# Patient Record
Sex: MALE | Race: WHITE | NOT HISPANIC OR LATINO | Employment: FULL TIME | URBAN - METROPOLITAN AREA
[De-identification: names, ages, dates, MRNs, and addresses within clinical notes are randomized per-mention and may not be internally consistent; named-entity substitution may affect disease eponyms.]

---

## 2017-05-22 ENCOUNTER — HOSPITAL ENCOUNTER (EMERGENCY)
Facility: HOSPITAL | Age: 32
Discharge: HOME | End: 2017-05-22
Attending: EMERGENCY MEDICINE
Payer: MEDICAID

## 2017-05-22 VITALS
BODY MASS INDEX: 28.89 KG/M2 | HEART RATE: 97 BPM | OXYGEN SATURATION: 99 % | RESPIRATION RATE: 18 BRPM | SYSTOLIC BLOOD PRESSURE: 135 MMHG | TEMPERATURE: 98 F | DIASTOLIC BLOOD PRESSURE: 74 MMHG | WEIGHT: 225 LBS

## 2017-05-22 DIAGNOSIS — M25.562 KNEE PAIN, LEFT: Primary | ICD-10-CM

## 2017-05-22 DIAGNOSIS — S83.92XA SPRAIN OF LEFT KNEE, UNSPECIFIED LIGAMENT, INITIAL ENCOUNTER: ICD-10-CM

## 2017-05-22 DIAGNOSIS — S80.12XA CONTUSION OF LEFT KNEE AND LOWER LEG, INITIAL ENCOUNTER: ICD-10-CM

## 2017-05-22 DIAGNOSIS — S80.02XA CONTUSION OF LEFT KNEE AND LOWER LEG, INITIAL ENCOUNTER: ICD-10-CM

## 2017-05-22 PROCEDURE — 25000003 PHARM REV CODE 250: Performed by: EMERGENCY MEDICINE

## 2017-05-22 PROCEDURE — 29505 APPLICATION LONG LEG SPLINT: CPT | Mod: LT

## 2017-05-22 PROCEDURE — 99283 EMERGENCY DEPT VISIT LOW MDM: CPT | Mod: 25

## 2017-05-22 RX ORDER — TRAMADOL HYDROCHLORIDE 50 MG/1
50 TABLET ORAL EVERY 6 HOURS PRN
Qty: 12 TABLET | Refills: 0 | Status: SHIPPED | OUTPATIENT
Start: 2017-05-22 | End: 2018-03-08

## 2017-05-22 RX ORDER — IBUPROFEN 200 MG
800 TABLET ORAL
Status: DISCONTINUED | OUTPATIENT
Start: 2017-05-22 | End: 2017-05-22

## 2017-05-22 RX ORDER — NAPROXEN 500 MG/1
500 TABLET ORAL 2 TIMES DAILY WITH MEALS
Qty: 20 TABLET | Refills: 0 | Status: SHIPPED | OUTPATIENT
Start: 2017-05-22 | End: 2018-03-08

## 2017-05-22 RX ORDER — HYDROCODONE BITARTRATE AND ACETAMINOPHEN 10; 325 MG/1; MG/1
1 TABLET ORAL
Status: COMPLETED | OUTPATIENT
Start: 2017-05-22 | End: 2017-05-22

## 2017-05-22 RX ORDER — HYDROCODONE BITARTRATE AND ACETAMINOPHEN 10; 325 MG/1; MG/1
1 TABLET ORAL
Status: DISCONTINUED | OUTPATIENT
Start: 2017-05-22 | End: 2017-05-22

## 2017-05-22 RX ORDER — NAPROXEN 500 MG/1
500 TABLET ORAL
Status: COMPLETED | OUTPATIENT
Start: 2017-05-22 | End: 2017-05-22

## 2017-05-22 RX ORDER — TRAMADOL HYDROCHLORIDE 50 MG/1
50 TABLET ORAL
Status: DISCONTINUED | OUTPATIENT
Start: 2017-05-22 | End: 2017-05-22

## 2017-05-22 RX ADMIN — HYDROCODONE BITARTRATE AND ACETAMINOPHEN 1 TABLET: 10; 325 TABLET ORAL at 12:05

## 2017-05-22 RX ADMIN — NAPROXEN 500 MG: 500 TABLET ORAL at 12:05

## 2017-05-22 NOTE — ED PROVIDER NOTES
Encounter Date: 5/22/2017       History     Chief Complaint   Patient presents with    Knee Pain     left knee pain x 1 week, denies inury     Review of patient's allergies indicates:   Allergen Reactions    Penicillins Anaphylaxis    Tramadol Palpitations       Leg Pain    The incident occurred at work. Injury mechanism: Patient has been kneeling on his knees at work and is aggravated injury to his left knee. The incident occurred yesterday. The pain is present in the left knee. The pain is at a severity of 4/10. The pain has been intermittent since onset. Pertinent negatives include no numbness, no inability to bear weight, no loss of motion, no muscle weakness, no loss of sensation and no tingling. He reports no foreign bodies present. The symptoms are aggravated by activity, bearing weight and palpation. He has tried nothing for the symptoms. The treatment provided no relief.    patient has a history of Osgood elliott's disease with some swelling of his left tibial tuberosity and has been underneath houses and kneeling and does not have his knee pads anymore so is aggravated his left knee and has emergency department complaining about pain.  He was unable to see his primary care physician.      Past Medical History:   Diagnosis Date    Osgood-Schlatter's disease of left lower extremity      Past Surgical History:   Procedure Laterality Date    NO PAST SURGERIES       Family History   Problem Relation Age of Onset    No Known Problems Mother     No Known Problems Father      Social History   Substance Use Topics    Smoking status: Current Every Day Smoker     Packs/day: 1.00     Types: Cigarettes    Smokeless tobacco: Never Used    Alcohol use 7.2 oz/week     12 Cans of beer per week      Comment: weekends     Review of Systems   Constitutional: Negative for fever.   HENT: Negative for sore throat.    Respiratory: Negative for shortness of breath.    Cardiovascular: Negative for chest pain.    Gastrointestinal: Negative for nausea.   Genitourinary: Negative for dysuria.   Musculoskeletal: Positive for arthralgias and joint swelling. Negative for back pain, myalgias, neck pain and neck stiffness.   Skin: Negative for color change and rash.   Neurological: Negative for tingling, weakness and numbness.   Hematological: Does not bruise/bleed easily.       Physical Exam     Initial Vitals [05/22/17 1030]   BP Pulse Resp Temp SpO2   138/82 68 16 97.8 °F (36.6 °C) 96 %     Physical Exam    Nursing note and vitals reviewed.  Constitutional: He appears well-developed and well-nourished.   HENT:   Head: Normocephalic and atraumatic.   Mouth/Throat: Oropharynx is clear and moist.   Eyes: EOM are normal. Pupils are equal, round, and reactive to light.   Neck: Normal range of motion. Neck supple.   Cardiovascular: Normal rate, regular rhythm, normal heart sounds and intact distal pulses.   Pulmonary/Chest: Breath sounds normal. No respiratory distress. He has no wheezes. He has no rhonchi.   Abdominal: Soft. Bowel sounds are normal. There is no rebound.   Musculoskeletal: Normal range of motion. He exhibits no edema.        Legs:  Neurological: He is alert and oriented to person, place, and time. He has normal strength. No cranial nerve deficit or sensory deficit.   Skin: Skin is warm and dry. No rash noted.   Psychiatric: He has a normal mood and affect. His behavior is normal. Judgment and thought content normal.         ED Course   Splint Application  Date/Time: 5/22/2017 4:50 PM  Performed by: VINH CERVANTES.  Authorized by: VINH CERVANTES   Location details: left knee  Splint type: Left knee immobilizer.  Post-procedure: The splinted body part was neurovascularly unchanged following the procedure.        Labs Reviewed - No data to display     Imaging Results          X-Ray Knee 3 View Left (Final result)  Result time 05/22/17 11:36:42    Final result by Hussain Lane MD (05/22/17 11:36:42)                  Impression:      Soft tissue swelling more notably along the inferior patellar tendon anterior to the tibial tuberosity with a tiny linear soft tissue calcification at this location..      Electronically signed by: CONNER PADRON MD  Date:     05/22/17  Time:    11:36              Narrative:    EXAM: XR KNEE 3 VIEW LEFT    CLINICAL HISTORY:  Left knee pain    FINDINGS:  Negative for fracture or dislocation.  No significant arthritic changes.  A mild soft tissue swelling around the knee soft tissue swelling anterior to the tibial tuberosity along the inferior patellar tendon with a tiny linear soft tissue calcification adjacent to the tibial tuberosity.                                                  ED Course     Clinical Impression:   The primary encounter diagnosis was Knee pain, left. Diagnoses of Contusion of left knee and lower leg, initial encounter and Sprain of left knee, unspecified ligament, initial encounter were also pertinent to this visit.          Brady Yen MD  05/22/17 5602

## 2018-02-16 ENCOUNTER — HOSPITAL ENCOUNTER (OUTPATIENT)
Dept: CARDIOLOGY | Facility: CLINIC | Age: 33
Discharge: HOME | End: 2018-02-16
Payer: MEDICAID

## 2018-02-16 DIAGNOSIS — R07.9 CHEST PAIN, UNSPECIFIED: ICD-10-CM

## 2018-02-16 DIAGNOSIS — R00.2 PALPITATIONS: Primary | ICD-10-CM

## 2018-02-16 DIAGNOSIS — R07.9 CHEST PAIN, UNSPECIFIED: Primary | ICD-10-CM

## 2018-02-16 PROCEDURE — 93005 ELECTROCARDIOGRAM TRACING: CPT | Mod: PBBFAC,PO | Performed by: NURSE PRACTITIONER

## 2018-02-16 PROCEDURE — 93010 ELECTROCARDIOGRAM REPORT: CPT | Mod: ,,, | Performed by: INTERNAL MEDICINE

## 2018-03-08 ENCOUNTER — HOSPITAL ENCOUNTER (EMERGENCY)
Facility: HOSPITAL | Age: 33
Discharge: HOME | End: 2018-03-08
Attending: EMERGENCY MEDICINE
Payer: MEDICAID

## 2018-03-08 VITALS
SYSTOLIC BLOOD PRESSURE: 126 MMHG | BODY MASS INDEX: 31.07 KG/M2 | RESPIRATION RATE: 14 BRPM | WEIGHT: 242 LBS | TEMPERATURE: 99 F | HEART RATE: 52 BPM | DIASTOLIC BLOOD PRESSURE: 69 MMHG | OXYGEN SATURATION: 97 %

## 2018-03-08 DIAGNOSIS — R03.0 ELEVATED BLOOD PRESSURE READING WITHOUT DIAGNOSIS OF HYPERTENSION: ICD-10-CM

## 2018-03-08 DIAGNOSIS — R42 VERTIGO: Primary | ICD-10-CM

## 2018-03-08 LAB
ALBUMIN SERPL BCP-MCNC: 4.2 G/DL
ALP SERPL-CCNC: 92 U/L
ALT SERPL W/O P-5'-P-CCNC: 20 U/L
ANION GAP SERPL CALC-SCNC: 9 MMOL/L
AST SERPL-CCNC: 15 U/L
BASOPHILS # BLD AUTO: 0.03 K/UL
BASOPHILS NFR BLD: 0.3 %
BILIRUB SERPL-MCNC: 0.3 MG/DL
BUN SERPL-MCNC: 20 MG/DL
CALCIUM SERPL-MCNC: 9.8 MG/DL
CHLORIDE SERPL-SCNC: 104 MMOL/L
CO2 SERPL-SCNC: 26 MMOL/L
CREAT SERPL-MCNC: 0.8 MG/DL
DIFFERENTIAL METHOD: ABNORMAL
EOSINOPHIL # BLD AUTO: 0.2 K/UL
EOSINOPHIL NFR BLD: 2.1 %
ERYTHROCYTE [DISTWIDTH] IN BLOOD BY AUTOMATED COUNT: 13.4 %
EST. GFR  (AFRICAN AMERICAN): >60 ML/MIN/1.73 M^2
EST. GFR  (NON AFRICAN AMERICAN): >60 ML/MIN/1.73 M^2
GLUCOSE SERPL-MCNC: 99 MG/DL
HCT VFR BLD AUTO: 43.4 %
HGB BLD-MCNC: 15 G/DL
LYMPHOCYTES # BLD AUTO: 2.6 K/UL
LYMPHOCYTES NFR BLD: 25.1 %
MCH RBC QN AUTO: 32.5 PG
MCHC RBC AUTO-ENTMCNC: 34.6 G/DL
MCV RBC AUTO: 94 FL
MONOCYTES # BLD AUTO: 0.9 K/UL
MONOCYTES NFR BLD: 8.7 %
NEUTROPHILS # BLD AUTO: 6.7 K/UL
NEUTROPHILS NFR BLD: 63.7 %
PLATELET # BLD AUTO: 234 K/UL
PMV BLD AUTO: 10.7 FL
POTASSIUM SERPL-SCNC: 4.1 MMOL/L
PROT SERPL-MCNC: 7.9 G/DL
RBC # BLD AUTO: 4.61 M/UL
SODIUM SERPL-SCNC: 139 MMOL/L
WBC # BLD AUTO: 10.46 K/UL

## 2018-03-08 PROCEDURE — 99283 EMERGENCY DEPT VISIT LOW MDM: CPT

## 2018-03-08 PROCEDURE — 25000003 PHARM REV CODE 250: Performed by: EMERGENCY MEDICINE

## 2018-03-08 PROCEDURE — 85025 COMPLETE CBC W/AUTO DIFF WBC: CPT

## 2018-03-08 PROCEDURE — 80053 COMPREHEN METABOLIC PANEL: CPT

## 2018-03-08 RX ORDER — MECLIZINE HYDROCHLORIDE 25 MG/1
25 TABLET ORAL 3 TIMES DAILY PRN
Qty: 20 TABLET | Refills: 0 | Status: SHIPPED | OUTPATIENT
Start: 2018-03-08

## 2018-03-08 RX ORDER — MECLIZINE HYDROCHLORIDE 25 MG/1
25 TABLET ORAL
Status: COMPLETED | OUTPATIENT
Start: 2018-03-08 | End: 2018-03-08

## 2018-03-08 RX ADMIN — MECLIZINE HYDROCHLORIDE 25 MG: 25 TABLET ORAL at 09:03

## 2018-03-09 NOTE — ED PROVIDER NOTES
Encounter Date: 3/8/2018       History     Chief Complaint   Patient presents with    Dizziness     intermittent x several months; reports bouts of confusion; reports recently worked up at Dr. Raza's office; pt has multiple medical complaints;      Patient currently presents with complaint of dizziness.  Onset was first noted several months ago though symptoms have waxed and waned.  Sensation is described as spinning.  This has occurred previously.  There have not been episodes of syncope or other LOC.  There is not associated SOB or CP.  There has not been suspicion of dehydration.   Patient has not initiated new medications.  Upper respiratory symptoms including hearing loss have not been noted.          Review of patient's allergies indicates:   Allergen Reactions    Penicillins Anaphylaxis    Tramadol Palpitations     Past Medical History:   Diagnosis Date    Osgood-Schlatter's disease of left lower extremity     Vertigo      Past Surgical History:   Procedure Laterality Date    NO PAST SURGERIES       Family History   Problem Relation Age of Onset    No Known Problems Mother     No Known Problems Father      Social History   Substance Use Topics    Smoking status: Current Every Day Smoker     Packs/day: 1.00     Types: Cigarettes    Smokeless tobacco: Never Used    Alcohol use 7.2 oz/week     12 Cans of beer per week      Comment: weekends     Review of Systems   Constitutional: Negative for chills and fever.   HENT: Negative for congestion.    Respiratory: Negative for chest tightness and shortness of breath.    Cardiovascular: Negative for chest pain and leg swelling.   Gastrointestinal: Negative for abdominal pain, constipation, diarrhea, nausea and vomiting.   Genitourinary: Negative for dysuria, frequency and urgency.   Skin: Negative for color change and rash.   Allergic/Immunologic: Negative for immunocompromised state.   Neurological: Positive for dizziness. Negative for weakness, numbness  and headaches.   Hematological: Negative for adenopathy. Does not bruise/bleed easily.   All other systems reviewed and are negative.      Physical Exam     Initial Vitals [03/08/18 1952]   BP Pulse Resp Temp SpO2   (!) 143/88 77 18 98.6 °F (37 °C) 98 %      MAP       106.33         Vitals:    03/08/18 1952 03/08/18 2131 03/08/18 2133 03/08/18 2134   BP: (!) 143/88 129/71 133/79 (!) 143/92   Pulse: 77 64 (!) 54 71   Resp: 18      Temp: 98.6 °F (37 °C)      TempSrc: Oral      SpO2: 98% 98% 99% 97%   Weight: 109.8 kg (242 lb)       03/08/18 2201   BP: 126/69   Pulse: (!) 52   Resp: 14   Temp:    TempSrc:    SpO2: 97%   Weight:          Physical Exam    Nursing note and vitals reviewed.  Constitutional: He appears well-developed and well-nourished. He is not diaphoretic. No distress.   HENT:   Head: Normocephalic and atraumatic.   Right Ear: External ear normal.   Left Ear: External ear normal.   Nose: Nose normal.   Mouth/Throat: Oropharynx is clear and moist.   Eyes: Conjunctivae and EOM are normal. Pupils are equal, round, and reactive to light. No scleral icterus.   Neck: Neck supple. No JVD present.   Cardiovascular: Normal rate, regular rhythm, normal heart sounds and intact distal pulses. Exam reveals no gallop and no friction rub.    No murmur heard.  Pulmonary/Chest: Breath sounds normal. No respiratory distress. He has no wheezes. He has no rhonchi. He has no rales.   Abdominal: Soft. Bowel sounds are normal. He exhibits no distension. There is no tenderness.   Musculoskeletal: Normal range of motion. He exhibits no edema.   Neurological: He is alert and oriented to person, place, and time. He has normal strength. No cranial nerve deficit or sensory deficit.   Skin: Skin is warm and dry. No rash noted.   Psychiatric: He has a normal mood and affect. His behavior is normal.         ED Course   Procedures  Labs Reviewed   CBC W/ AUTO DIFFERENTIAL - Abnormal; Notable for the following:        Result Value    MCH  32.5 (*)     All other components within normal limits   COMPREHENSIVE METABOLIC PANEL     EKG Readings: (Independently Interpreted)   Initial Reading: No STEMI. Rhythm: Sinus Arrhythmia. Heart Rate: 64. Ectopy: No Ectopy. Conduction: Normal. Axis: Normal.          Medical Decision Making:   ED Management:  All findings were reviewed with the patient/family in detail along with the diagnosis of vertigo.  I see no indication of an emergent process beyond that addressed during our encounter but have duly counseled the patient/family regarding the need for prompt follow-up as well as the indications that should prompt immediate return to the emergency room should new or worrisome developments occur.  The patient/family communicates understanding of all this information and all remaining questions and concerns were addressed at this time.    Based on vital signs taken here in the emergency room today, the patient was additionally counseled regarding an elevated blood pressure concerning for pre-hypertension/hypertension.  Accordingly the patient has been advised to follow with the primary care physician for reassessment and management as needed.                        Clinical Impression:   The primary encounter diagnosis was Vertigo. A diagnosis of Elevated blood pressure reading without diagnosis of hypertension was also pertinent to this visit.                           Chong Ly MD  03/15/18 0437

## 2018-11-22 ENCOUNTER — HOSPITAL ENCOUNTER (EMERGENCY)
Facility: HOSPITAL | Age: 33
Discharge: HOME | End: 2018-11-23
Attending: EMERGENCY MEDICINE
Payer: MEDICAID

## 2018-11-22 DIAGNOSIS — M25.569 KNEE PAIN: ICD-10-CM

## 2018-11-22 DIAGNOSIS — M25.561 ACUTE PAIN OF RIGHT KNEE: ICD-10-CM

## 2018-11-22 DIAGNOSIS — M25.461 KNEE EFFUSION, RIGHT: Primary | ICD-10-CM

## 2018-11-22 PROCEDURE — 99283 EMERGENCY DEPT VISIT LOW MDM: CPT | Mod: 25

## 2018-11-22 PROCEDURE — 29505 APPLICATION LONG LEG SPLINT: CPT | Mod: RT

## 2018-11-22 RX ORDER — IBUPROFEN 200 MG
800 TABLET ORAL
Status: COMPLETED | OUTPATIENT
Start: 2018-11-23 | End: 2018-11-23

## 2018-11-23 VITALS
SYSTOLIC BLOOD PRESSURE: 131 MMHG | TEMPERATURE: 98 F | DIASTOLIC BLOOD PRESSURE: 84 MMHG | WEIGHT: 275 LBS | HEIGHT: 74 IN | BODY MASS INDEX: 35.29 KG/M2 | OXYGEN SATURATION: 99 % | RESPIRATION RATE: 20 BRPM | HEART RATE: 81 BPM

## 2018-11-23 PROCEDURE — 29505 APPLICATION LONG LEG SPLINT: CPT | Mod: RT

## 2018-11-23 PROCEDURE — 25000003 PHARM REV CODE 250: Performed by: EMERGENCY MEDICINE

## 2018-11-23 RX ORDER — HYDROCODONE BITARTRATE AND ACETAMINOPHEN 10; 325 MG/1; MG/1
1 TABLET ORAL
Status: COMPLETED | OUTPATIENT
Start: 2018-11-23 | End: 2018-11-23

## 2018-11-23 RX ADMIN — HYDROCODONE BITARTRATE AND ACETAMINOPHEN 1 TABLET: 10; 325 TABLET ORAL at 12:11

## 2018-11-23 RX ADMIN — IBUPROFEN 800 MG: 200 TABLET, FILM COATED ORAL at 12:11

## 2018-11-23 NOTE — DISCHARGE INSTRUCTIONS
You will need to follow-up with either orthopedics or primary care physician for an MRI of that knee.  He can take over-the-counter anti-inflammatories such as ibuprofen or Aleve for pain and swelling

## 2018-11-23 NOTE — ED PROVIDER NOTES
Encounter Date: 11/22/2018       History     Chief Complaint   Patient presents with    Leg Injury     c/o right medial knee after tripping and falling     Patient is a 33-year-old male, who presents today with complaints of acute onset right knee pain. Patient states that he tripped over a log outside and heard a pop in his right knee.  He has been on able to bear weight without pain on that right knee since the fall.  He has had no prior evaluation, and came here immediately after the fall.  He reports significant swelling of that right knee since the injury. He denied any obvious anatomic deformity such as a knee dislocation at any point.  Denies any pain, numbness, paresthesias distal to the knee.          Review of patient's allergies indicates:   Allergen Reactions    Pcn [penicillins] Anaphylaxis    Tramadol Palpitations     Past Medical History:   Diagnosis Date    Osgood-Schlatter's disease of left lower extremity     Vertigo      Past Surgical History:   Procedure Laterality Date    NO PAST SURGERIES       Family History   Problem Relation Age of Onset    No Known Problems Mother     No Known Problems Father      Social History     Tobacco Use    Smoking status: Current Every Day Smoker     Packs/day: 1.00     Types: Cigarettes    Smokeless tobacco: Never Used   Substance Use Topics    Alcohol use: Yes     Alcohol/week: 7.2 oz     Types: 12 Cans of beer per week     Comment: weekends    Drug use: Yes     Types: Marijuana     Review of Systems    Constitutional: No fevers, no fatigue  HENT: No headache, no facial pain, no hearing loss  Eyes: No vision changes, no eye pain  Neck/Back: No neck pain, no back pain  Cardiovascular: No chest pain, no palpitations, no syncope  Respiratory: no SOB, no cough  Abdominal: no abdominal pain, no N/V  Genitourinary: no pelvic pain, no genital pain  Musculoskeletal:  Right knee pain, swelling  Neurological: No numbness, no paresthesias, no weakness, no  LOC      Physical Exam     Initial Vitals [11/22/18 2250]   BP Pulse Resp Temp SpO2   132/88 84 20 98 °F (36.7 °C) 99 %      MAP       --         Physical Exam   Constitutional: Awake, alert, NAD  HENT: normocephalic, no facial bone tenderness, no evidence of basilar skull fx  Eyes: PERRL, EOM, normal conjunctiva  Neck: Trachea midline, nontender, full ROM  Cardiovascular: RRR, 2+ palpable pulses in all 4 extremities  Pulmonary: Non-labored respirations, equal bilateral breath sounds, LCTAB  Chest Wall: No tenderness, no deformity  Abdominal: Soft, nontender, nondistended  Back: Nontender, no step-offs  Musculoskeletal: Moving all 4 extremities, compartments soft, obvious swelling of the right knee; neurovascularly intact distally; patient able to extend the knee; no excessive laxity in all directions to suggest knee dislocation; no patella dislocation  Neurological: AAO x4, GCS 15, maintaining airway and answering questions appropriately, no focal deficits  Skin:  Skin intact with no abrasions, no lacerations, no ecchymosis      ED Course   Procedures  Labs Reviewed - No data to display       Imaging Results          X-Ray Knee Complete 4 Or More Views Right (Final result)  Result time 11/22/18 23:47:24    Final result by James Mccurdy III, MD (11/22/18 23:47:24)                 Impression:      Large joint effusion.  Otherwise unremarkable right knee series.      Electronically signed by: James Mccurdy MD  Date:    11/22/2018  Time:    23:47             Narrative:    EXAMINATION:  XR KNEE COMP 4 OR MORE VIEWS RIGHT    CLINICAL HISTORY:  Pain in unspecified knee; pain in right knee    FINDINGS:  Bone alignment is satisfactory.  No fracture or dislocation. The joint spaces appear well preserved.  No erosive change, osteochondral defects or loose intra-articular osteochondral bodies identified.  There is a large joint effusion.                                 Medical Decision Making:   Right knee injury with  significant swelling; patient states he heard a pop; suspect ligamentous and-or meniscus injury; x-ray ruled out fracture or dislocation; neurovascularly intact distally; advised patient of my high suspicion of internal knee injury and the need for an MRI to accurately diagnose his injury; patient advised follow up with his primary care physician or orthopedic (whoever could arrange an MRI in a timely fashion); patient placed in a knee immobilizer and given crutches to use in the meantime      Medications   ibuprofen tablet 800 mg (800 mg Oral Given 11/23/18 0009)   HYDROcodone-acetaminophen  mg per tablet 1 tablet (1 tablet Oral Given 11/23/18 0009)                         Clinical Impression:   Diagnosis:  Acute right knee pain, right knee swelling  Disposition:  Discharged home in stable condition with crutches and a knee immobilizer  Prescriptions:  Patient declined offer for a prescription for narcotics.  Patient advised over-the-counter analgesics  Follow-up Instructions:  Patient will need to follow up with primary care physician or orthopedic to arrange an MRI as soon as possible.                             Larry Grace MD  11/23/18 0109

## 2018-11-23 NOTE — ED NOTES
LOC: The patient is awake, alert and aware of environment with an appropriate affect, the patient is oriented x 3 and speaking appropriately.  APPEARANCE: Patient resting comfortably and in no acute distress, patient is clean and well groomed, patient's clothing is properly fastened.  HEENT: Brief WNL  SKIN: Brief WNL.   MUSCULOSKELETAL: Brief WNL.pain to right knee. Pt unable to put weight on right leg. Pedal pulse palpable bilateral  RESPIRATORY: Brief WNL  CARDIAC: Brief WNL  GASTRO: Brief WNL  : Brief WNL  Peripheral Vasc: Brief WNL  NEURO: Brief WNL  PSYCH: Brief WNL

## 2019-04-08 ENCOUNTER — HISTORICAL (OUTPATIENT)
Dept: ADMINISTRATIVE | Facility: HOSPITAL | Age: 34
End: 2019-04-08

## 2019-04-16 ENCOUNTER — HISTORICAL (OUTPATIENT)
Dept: ADMINISTRATIVE | Facility: HOSPITAL | Age: 34
End: 2019-04-16

## 2019-04-16 LAB
ABS NEUT (OLG): 6.14 X10(3)/MCL (ref 2.1–9.2)
BASOPHILS # BLD AUTO: 0.04 X10(3)/MCL
BASOPHILS NFR BLD AUTO: 0 %
BUN SERPL-MCNC: 15 MG/DL (ref 7–18)
CALCIUM SERPL-MCNC: 9.2 MG/DL (ref 8.5–10.1)
CHLORIDE SERPL-SCNC: 105 MMOL/L (ref 98–107)
CO2 SERPL-SCNC: 31 MMOL/L (ref 21–32)
CREAT SERPL-MCNC: 0.8 MG/DL (ref 0.6–1.3)
CREAT/UREA NIT SERPL: 19
EOSINOPHIL # BLD AUTO: 0.18 10*3/UL
EOSINOPHIL NFR BLD AUTO: 2 %
ERYTHROCYTE [DISTWIDTH] IN BLOOD BY AUTOMATED COUNT: 13.1 % (ref 11.5–14.5)
GLUCOSE SERPL-MCNC: 82 MG/DL (ref 74–106)
HCT VFR BLD AUTO: 44.8 % (ref 40–51)
HGB BLD-MCNC: 14.7 GM/DL (ref 13.5–17.5)
IMM GRANULOCYTES # BLD AUTO: 0.02 10*3/UL
IMM GRANULOCYTES NFR BLD AUTO: 0 %
LYMPHOCYTES # BLD AUTO: 2.48 X10(3)/MCL
LYMPHOCYTES NFR BLD AUTO: 26 % (ref 13–40)
MCH RBC QN AUTO: 32.2 PG (ref 26–34)
MCHC RBC AUTO-ENTMCNC: 32.8 GM/DL (ref 31–37)
MCV RBC AUTO: 98 FL (ref 80–100)
MONOCYTES # BLD AUTO: 0.6 X10(3)/MCL
MONOCYTES NFR BLD AUTO: 6 % (ref 4–12)
NEUTROPHILS # BLD AUTO: 6.14 X10(3)/MCL
NEUTROPHILS NFR BLD AUTO: 65 X10(3)/MCL
PLATELET # BLD AUTO: 249 X10(3)/MCL (ref 130–400)
PMV BLD AUTO: 11.3 FL (ref 7.4–10.4)
POTASSIUM SERPL-SCNC: 3.5 MMOL/L (ref 3.5–5.1)
RBC # BLD AUTO: 4.57 X10(6)/MCL (ref 4.5–5.9)
SODIUM SERPL-SCNC: 139 MMOL/L (ref 136–145)
WBC # SPEC AUTO: 9.5 X10(3)/MCL (ref 4.5–11)

## 2019-04-23 ENCOUNTER — HISTORICAL (OUTPATIENT)
Dept: SURGERY | Facility: HOSPITAL | Age: 34
End: 2019-04-23

## 2019-04-23 LAB
AMPHET UR QL SCN: NEGATIVE
BARBITURATE SCN PRESENT UR: NEGATIVE
BENZODIAZ UR QL SCN: NEGATIVE
CANNABINOIDS UR QL SCN: POSITIVE
COCAINE UR QL SCN: NEGATIVE
OPIATES UR QL SCN: NEGATIVE
PCP UR QL: NEGATIVE
PH UR STRIP.AUTO: 6.5 [PH] (ref 5–8)
TEMPERATURE, URINE (OHS): 25 DEGC (ref 20–25)

## 2020-02-19 ENCOUNTER — HISTORICAL (OUTPATIENT)
Dept: ADMINISTRATIVE | Facility: HOSPITAL | Age: 35
End: 2020-02-19

## 2020-03-18 ENCOUNTER — HISTORICAL (OUTPATIENT)
Dept: RADIOLOGY | Facility: HOSPITAL | Age: 35
End: 2020-03-18

## 2020-06-03 ENCOUNTER — HOSPITAL ENCOUNTER (EMERGENCY)
Facility: HOSPITAL | Age: 35
Discharge: HOME | End: 2020-06-03
Attending: EMERGENCY MEDICINE
Payer: MEDICAID

## 2020-06-03 VITALS
HEART RATE: 70 BPM | HEIGHT: 74 IN | TEMPERATURE: 98 F | BODY MASS INDEX: 33.51 KG/M2 | SYSTOLIC BLOOD PRESSURE: 124 MMHG | DIASTOLIC BLOOD PRESSURE: 70 MMHG | OXYGEN SATURATION: 97 % | RESPIRATION RATE: 20 BRPM | WEIGHT: 261.13 LBS

## 2020-06-03 DIAGNOSIS — R07.9 CHEST PAIN, UNSPECIFIED: ICD-10-CM

## 2020-06-03 DIAGNOSIS — R42 DIZZINESS: Primary | ICD-10-CM

## 2020-06-03 LAB
ALBUMIN SERPL BCP-MCNC: 4 G/DL (ref 3.5–5.2)
ALP SERPL-CCNC: 79 U/L (ref 55–135)
ALT SERPL W/O P-5'-P-CCNC: 39 U/L (ref 10–44)
ANION GAP SERPL CALC-SCNC: 7 MMOL/L (ref 8–16)
AST SERPL-CCNC: 26 U/L (ref 10–40)
BASOPHILS # BLD AUTO: 0.03 K/UL (ref 0–0.2)
BASOPHILS NFR BLD: 0.4 % (ref 0–1.9)
BILIRUB SERPL-MCNC: 0.4 MG/DL (ref 0.1–1)
BILIRUB UR QL STRIP: NEGATIVE
BNP SERPL-MCNC: <10 PG/ML (ref 0–99)
BUN SERPL-MCNC: 16 MG/DL (ref 6–20)
CALCIUM SERPL-MCNC: 9.2 MG/DL (ref 8.7–10.5)
CHLORIDE SERPL-SCNC: 105 MMOL/L (ref 95–110)
CLARITY UR REFRACT.AUTO: CLEAR
CO2 SERPL-SCNC: 25 MMOL/L (ref 23–29)
COLOR UR AUTO: YELLOW
CREAT SERPL-MCNC: 0.8 MG/DL (ref 0.5–1.4)
DIFFERENTIAL METHOD: ABNORMAL
EOSINOPHIL # BLD AUTO: 0.2 K/UL (ref 0–0.5)
EOSINOPHIL NFR BLD: 1.9 % (ref 0–8)
ERYTHROCYTE [DISTWIDTH] IN BLOOD BY AUTOMATED COUNT: 13.3 % (ref 11.5–14.5)
EST. GFR  (AFRICAN AMERICAN): >60 ML/MIN/1.73 M^2
EST. GFR  (NON AFRICAN AMERICAN): >60 ML/MIN/1.73 M^2
GLUCOSE SERPL-MCNC: 104 MG/DL (ref 70–110)
GLUCOSE UR QL STRIP: NEGATIVE
HCT VFR BLD AUTO: 43.1 % (ref 40–54)
HGB BLD-MCNC: 14.4 G/DL (ref 14–18)
HGB UR QL STRIP: NEGATIVE
IMM GRANULOCYTES # BLD AUTO: 0.01 K/UL (ref 0–0.04)
IMM GRANULOCYTES NFR BLD AUTO: 0.1 % (ref 0–0.5)
KETONES UR QL STRIP: NEGATIVE
LEUKOCYTE ESTERASE UR QL STRIP: NEGATIVE
LYMPHOCYTES # BLD AUTO: 1.9 K/UL (ref 1–4.8)
LYMPHOCYTES NFR BLD: 23 % (ref 18–48)
MCH RBC QN AUTO: 32.7 PG (ref 27–31)
MCHC RBC AUTO-ENTMCNC: 33.4 G/DL (ref 32–36)
MCV RBC AUTO: 98 FL (ref 82–98)
MONOCYTES # BLD AUTO: 0.7 K/UL (ref 0.3–1)
MONOCYTES NFR BLD: 8.4 % (ref 4–15)
NEUTROPHILS # BLD AUTO: 5.5 K/UL (ref 1.8–7.7)
NEUTROPHILS NFR BLD: 66.2 % (ref 38–73)
NITRITE UR QL STRIP: NEGATIVE
NRBC BLD-RTO: 0 /100 WBC
PH UR STRIP: 6 [PH] (ref 5–8)
PLATELET # BLD AUTO: 247 K/UL (ref 150–350)
PMV BLD AUTO: 10.8 FL (ref 9.2–12.9)
POTASSIUM SERPL-SCNC: 4.1 MMOL/L (ref 3.5–5.1)
PROT SERPL-MCNC: 7.6 G/DL (ref 6–8.4)
PROT UR QL STRIP: NEGATIVE
RBC # BLD AUTO: 4.4 M/UL (ref 4.6–6.2)
SODIUM SERPL-SCNC: 137 MMOL/L (ref 136–145)
SP GR UR STRIP: 1.02 (ref 1–1.03)
TROPONIN I SERPL DL<=0.01 NG/ML-MCNC: 0.01 NG/ML (ref 0–0.03)
URN SPEC COLLECT METH UR: NORMAL
UROBILINOGEN UR STRIP-ACNC: NEGATIVE EU/DL
WBC # BLD AUTO: 8.33 K/UL (ref 3.9–12.7)

## 2020-06-03 PROCEDURE — 81003 URINALYSIS AUTO W/O SCOPE: CPT | Mod: ER

## 2020-06-03 PROCEDURE — 96360 HYDRATION IV INFUSION INIT: CPT | Mod: ER

## 2020-06-03 PROCEDURE — 93010 EKG 12-LEAD: ICD-10-PCS | Mod: ,,, | Performed by: INTERNAL MEDICINE

## 2020-06-03 PROCEDURE — 93010 ELECTROCARDIOGRAM REPORT: CPT | Mod: ,,, | Performed by: INTERNAL MEDICINE

## 2020-06-03 PROCEDURE — 80053 COMPREHEN METABOLIC PANEL: CPT | Mod: ER

## 2020-06-03 PROCEDURE — 85025 COMPLETE CBC W/AUTO DIFF WBC: CPT | Mod: ER

## 2020-06-03 PROCEDURE — 84484 ASSAY OF TROPONIN QUANT: CPT | Mod: ER

## 2020-06-03 PROCEDURE — 83880 ASSAY OF NATRIURETIC PEPTIDE: CPT | Mod: ER

## 2020-06-03 PROCEDURE — 93005 ELECTROCARDIOGRAM TRACING: CPT | Mod: ER

## 2020-06-03 PROCEDURE — 99285 EMERGENCY DEPT VISIT HI MDM: CPT | Mod: 25,ER

## 2020-06-03 PROCEDURE — 25000003 PHARM REV CODE 250: Mod: ER | Performed by: EMERGENCY MEDICINE

## 2020-06-03 RX ORDER — VENLAFAXINE 75 MG/1
75 TABLET ORAL 2 TIMES DAILY
COMMUNITY

## 2020-06-03 RX ORDER — ALPRAZOLAM 0.5 MG/1
0.5 TABLET ORAL 3 TIMES DAILY
COMMUNITY

## 2020-06-03 RX ADMIN — SODIUM CHLORIDE 1000 ML: 0.9 INJECTION, SOLUTION INTRAVENOUS at 11:06

## 2020-06-03 NOTE — ED PROVIDER NOTES
Encounter Date: 6/3/2020       History     Chief Complaint   Patient presents with    Dizziness     sob , nausea,tired and chest tightness interm for 1 month. seen by ed and pcp. hx anxiety and did see pschy,. today became lighted and felt like with going to fall out and chest tightness started approx. 20 min ago     The history is provided by the patient.   Dizziness   This is a recurrent problem. The current episode started more than 1 week ago. The problem occurs every several days. The problem has not changed since onset.Associated symptoms include chest pain. Pertinent negatives include no abdominal pain, no headaches and no shortness of breath. Nothing aggravates the symptoms. Nothing relieves the symptoms. He has tried nothing for the symptoms. The treatment provided no relief.     Review of patient's allergies indicates:   Allergen Reactions    Pcn [penicillins] Anaphylaxis    Tramadol Palpitations     Past Medical History:   Diagnosis Date    Anxiety     Depression     Osgood-Schlatter's disease of left lower extremity     Vertigo      Past Surgical History:   Procedure Laterality Date    LEG SURGERY      right    NO PAST SURGERIES       Family History   Problem Relation Age of Onset    No Known Problems Mother     No Known Problems Father      Social History     Tobacco Use    Smoking status: Current Every Day Smoker     Packs/day: 0.50     Types: Cigarettes    Smokeless tobacco: Never Used   Substance Use Topics    Alcohol use: Yes     Alcohol/week: 2.0 standard drinks     Types: 2 Cans of beer per week     Comment: daily     Drug use: Not Currently     Review of Systems   Respiratory: Negative for shortness of breath.    Cardiovascular: Positive for chest pain.   Gastrointestinal: Negative for abdominal pain.   Neurological: Positive for dizziness. Negative for headaches.   All other systems reviewed and are negative.      Physical Exam     Initial Vitals [06/03/20 1037]   BP Pulse Resp  Temp SpO2   136/86 73 20 98.4 °F (36.9 °C) 98 %      MAP       --         Physical Exam    Nursing note and vitals reviewed.  Constitutional: He appears well-developed and well-nourished.   HENT:   Head: Normocephalic and atraumatic.   Mouth/Throat: Oropharynx is clear and moist.   Eyes: Conjunctivae and EOM are normal. Pupils are equal, round, and reactive to light.   Neck: Normal range of motion. Neck supple.   Cardiovascular: Normal rate, regular rhythm and normal heart sounds.   Pulmonary/Chest: Breath sounds normal.   Abdominal: Soft. Bowel sounds are normal.   Musculoskeletal: Normal range of motion.   Neurological: He is alert and oriented to person, place, and time. He has normal strength.   Skin: Skin is warm and dry.   Psychiatric: He has a normal mood and affect. Thought content normal.         ED Course   Procedures  Labs Reviewed   CBC W/ AUTO DIFFERENTIAL - Abnormal; Notable for the following components:       Result Value    RBC 4.40 (*)     Mean Corpuscular Hemoglobin 32.7 (*)     All other components within normal limits   COMPREHENSIVE METABOLIC PANEL - Abnormal; Notable for the following components:    Anion Gap 7 (*)     All other components within normal limits   B-TYPE NATRIURETIC PEPTIDE   URINALYSIS, REFLEX TO URINE CULTURE    Narrative:     Preferred Collection Type->Urine, Clean Catch   TROPONIN I   HIV 1 / 2 ANTIBODY     Results for orders placed or performed during the hospital encounter of 06/03/20   CBC auto differential   Result Value Ref Range    WBC 8.33 3.90 - 12.70 K/uL    RBC 4.40 (L) 4.60 - 6.20 M/uL    Hemoglobin 14.4 14.0 - 18.0 g/dL    Hematocrit 43.1 40.0 - 54.0 %    Mean Corpuscular Volume 98 82 - 98 fL    Mean Corpuscular Hemoglobin 32.7 (H) 27.0 - 31.0 pg    Mean Corpuscular Hemoglobin Conc 33.4 32.0 - 36.0 g/dL    RDW 13.3 11.5 - 14.5 %    Platelets 247 150 - 350 K/uL    MPV 10.8 9.2 - 12.9 fL    Immature Granulocytes 0.1 0.0 - 0.5 %    Gran # (ANC) 5.5 1.8 - 7.7 K/uL     Immature Grans (Abs) 0.01 0.00 - 0.04 K/uL    Lymph # 1.9 1.0 - 4.8 K/uL    Mono # 0.7 0.3 - 1.0 K/uL    Eos # 0.2 0.0 - 0.5 K/uL    Baso # 0.03 0.00 - 0.20 K/uL    nRBC 0 0 /100 WBC    Gran% 66.2 38.0 - 73.0 %    Lymph% 23.0 18.0 - 48.0 %    Mono% 8.4 4.0 - 15.0 %    Eosinophil% 1.9 0.0 - 8.0 %    Basophil% 0.4 0.0 - 1.9 %    Differential Method Automated    Comprehensive metabolic panel   Result Value Ref Range    Sodium 137 136 - 145 mmol/L    Potassium 4.1 3.5 - 5.1 mmol/L    Chloride 105 95 - 110 mmol/L    CO2 25 23 - 29 mmol/L    Glucose 104 70 - 110 mg/dL    BUN, Bld 16 6 - 20 mg/dL    Creatinine 0.8 0.5 - 1.4 mg/dL    Calcium 9.2 8.7 - 10.5 mg/dL    Total Protein 7.6 6.0 - 8.4 g/dL    Albumin 4.0 3.5 - 5.2 g/dL    Total Bilirubin 0.4 0.1 - 1.0 mg/dL    Alkaline Phosphatase 79 55 - 135 U/L    AST 26 10 - 40 U/L    ALT 39 10 - 44 U/L    Anion Gap 7 (L) 8 - 16 mmol/L    eGFR if African American >60.0 >60 mL/min/1.73 m^2    eGFR if non African American >60.0 >60 mL/min/1.73 m^2   Brain Natriuretic Peptide   Result Value Ref Range    BNP <10 0 - 99 pg/mL   Urinalysis, Reflex to Urine Culture Urine, Clean Catch   Result Value Ref Range    Specimen UA Urine, Clean Catch     Color, UA Yellow Yellow, Straw, Vita    Appearance, UA Clear Clear    pH, UA 6.0 5.0 - 8.0    Specific Gravity, UA 1.025 1.005 - 1.030    Protein, UA Negative Negative    Glucose, UA Negative Negative    Ketones, UA Negative Negative    Bilirubin (UA) Negative Negative    Occult Blood UA Negative Negative    Nitrite, UA Negative Negative    Urobilinogen, UA Negative <2.0 EU/dL    Leukocytes, UA Negative Negative   Troponin I   Result Value Ref Range    Troponin I 0.012 0.000 - 0.026 ng/mL          ECG Results          EKG 12-lead (In process)  Result time 06/03/20 11:52:23    In process by Interface, Lab In [facility] (06/03/20 11:52:23)                 Narrative:    Test Reason : R07.9,    Vent. Rate : 069 BPM     Atrial Rate : 069 BPM     P-R  Int : 176 ms          QRS Dur : 088 ms      QT Int : 388 ms       P-R-T Axes : 044 057 026 degrees     QTc Int : 415 ms    Normal sinus rhythm  Normal ECG  When compared with ECG of 08-MAR-2018 21:16,  No significant change was found    Referred By: GILDA   SELF           Confirmed By:                             Imaging Results          X-Ray Chest 1 View (Final result)  Result time 06/03/20 11:13:16    Final result by Larry Neal MD (06/03/20 11:13:16)                 Impression:      No acute process seen.      Electronically signed by: Larry Neal MD  Date:    06/03/2020  Time:    11:13             Narrative:    EXAMINATION:  XR CHEST 1 VIEW    CLINICAL HISTORY:  Dizziness and giddiness    FINDINGS:  Single view of the chest.  Comparison 08/21/2016    Cardiac silhouette is normal.  The lungs demonstrate no evidence of active disease.  No evidence of pleural effusion or pneumothorax.  Bones appear intact.                            12:08 PM - Counseling: Spoke with the patient and discussed todays findings, in addition to providing specific details for the plan of care and counseling regarding the diagnosis and prognosis. Questions are answered at this time. I have discussed with patient and/or family/caretaker chest pain precautions, specifically to return for worsening chest pain, shortness of breath, fever, or any concern.  I have low suspicion for cardiopulmonary, vascular, infectious, respiratory, or other emergent medical condition based on my evaluation in the ED.                                         Clinical Impression:       ICD-10-CM ICD-9-CM   1. Dizziness R42 780.4   2. Chest pain, unspecified R07.9 786.50         Disposition:   Disposition: Discharged  Condition: Stable     ED Disposition Condition    Discharge Stable        ED Prescriptions     None        Follow-up Information     Follow up With Specialties Details Why Contact Info    [facility]  Call in 1 day  [address]  [facility] [address]  [phone]      [facility]  If symptoms worsen [id number] [address] [phone]                                     Bharathi Estrada MD  06/03/20 4053

## 2022-04-29 NOTE — PROGRESS NOTES
Patient:   Donnell Alvarez             MRN: [id number]            FIN: [id number]               Age:   34 years     Sex:  Male     :  1985   Associated Diagnoses:   None   Author:   Alberto Montez MD      Discussed results of knee MRI with patient.  No ACL tear, degenerative cartilage changes, possible new medial meniscal tear.  Patient reports feeling much better compared to his last clinic visit.  He will return to his activities as tolerated and may follow up in clinic if he continues to have knee issues, once COVID resolves.

## 2022-04-29 NOTE — H&P
Patient:   Donnell Alvarez             MRN: [id number]            FIN: [id number]               Age:   33 years     Sex:  Male     :  1985   Associated Diagnoses:   None   Author:   Jason JEAN, Hola Honeycutt NPO as appropriate, site marked  Consent in chart    To OR for R ACL reconstruction using autograft, allo backup.     No changes as below      Hola Gomez MD      Chief Complaint   referral from Ortho NP for R knee pain History of Present Illness 33-year-old male, , injured his right knee after a twisting injury while falling Thanks2018. Right after injury he had significant swelling the right knee. Since then his swelling has subsided. He has had residual instability of his right knee. He feels like his knee gives out on him when he turns a corner while walking on flat ground. Additionally he has some catching and clicking sensation in his knee. He was seen in sports medicine clinic MRI was obtained of the right knee he was diagnosed with an ACL tear and a medial meniscus tear. Review of Systems As per HPI Physical Exam Vitals & Measurements HT: 188 cm WT: 111.2 kg BMI: 31.46 Gen: NAD, A+Ox3  Cardio: RRR  Pulm: No increased WOB    RLE:  Skin intact  No swelling, no effusion  TTP posteromedial joint line  Motor: intact ehl, fhl, ta, gs  SILT: t/s/s/sp/dp  2+ DP, bcr, wwp  3B lachman, + pivot shift, + Mcmurrays  negative dial, stable knee with varus/valgus at 0/30 deg  neg post drawer and posterior sag    MRI right knee demonstrates ACL tear medial meniscus posterior horn tear. Assessment/Plan 1. Sprain of anterior cruciate ligament of right knee, initial encounter   33-year-old male with right ACL tear and right medial meniscus posterior horn tear.  -I discussed surgical and nonsurgical options for the patient. Given the fact that he is having persistent instability of the knee and mechanical symptoms I recommended right ACL reconstruction with medial meniscus repair versus  debridement. With regard to the graft counseled him on allograft versus autograft. We will try to reconstruct his ACL with hamstring autograft tissue. I explained to him that allograft has associated risk of contraction of communicable diseases though this is a small risk he is informed and he consents to this if needed.  -I counseled the patient on likely rehab restrictions and durations and he is as he will be compliant with our restrictions.  -Counseled the patient on smoking cessation prior to surgery.

## 2022-04-29 NOTE — OP NOTE
Patient:   Donnell Alvarez             MRN: [id number]            FIN: [id number]               Age:   33 years     Sex:  Male     :  1985   Associated Diagnoses:   None   Author:   Hola Gomez MD      Operative report    Date of surgery: 2019    Surgeon: Hola Gomez MD    Staff: Irvin Julian MD,  Delvis Curtis MD    Preoperative diagnosis: Right ACL rupture with posterior horn of medial meniscus tear    Postoperative diagnosis: Right knee ACL rupture with a radial tear of the posterior horn of the medial meniscus    EBL: Minimal    Implants ACL tight rope button, Arthrex, 9 mm reamed total.  A 10 mm graft bolt and screw in 9.5 mm tunnel    Complications: None    Procedure: ACL reconstruction with hamstrings autograft: Partial meniscectomy of radial meniscus tear of the posterior horn of the medial meniscus.    Operative technique:  Patient is a 33-year-old male who sustained a twisting injury to the knee last fall.  He is sustaining significant instability and knee subluxation episodes since this time the include him for working as a  like he normally does and living his daily life is normal.  We did long discussion of treatment options including operative and nonoperative.  He was discovered to have a complete ACL rupture.  After discussion of the risks and benefits he elected to proceed with ACL reconstruction to assist regaining the stability of his knee joint.  We discussed autograft options including BTB, hamstrings autograft, and allograft.  He elected to proceed with hamstrings autograft which we believe the best choice for this patient as well.  The patient signed informed consent.  He was met in the preoperative area and he was verified to be n.p.o., the consent was in the chart, the site was marked, all questions were answered, the patient was taken to the OR and administered perioperative antibiotics as appropriate.  He was placed in the supine position on the  operative table with all bony prominences well-padded and administered general endotracheal anesthesia by the anesthesia team.  A tourniquet was placed to the operative extremity and the limb was prepped and draped in the usual sterile fashion.  A timeout was performed.  The limb was exsanguinated and the tourniquet was inflated to 300 mmHg.    With 3 cm incision was performed with a 15 blade over the insertion of the peds anserine tendons.  Dissection was carried through soft tissue and the overlying fascia of the tendons were incised.  The semi-tendinosis and gracilis tendons were identified and whipstitch with FiberWire suture.  There freed of all adhesions and the tendon stripper was passed in order to obtain each tendon in a harvest fashion.  The tendons were prepared on the back table and stripped of all muscle tissue with a Rolla.  The tendons were placed under 15 pounds of tension and wrapped in a moistened laparotomy sponge.    At 11 blade was used to perform a standard anterior lateral portal.  Trocar was entered into the knee joint and into the suprapatellar pouch.  The camera was entered into the suprapatellar pouch to proceed with diagnostic arthroscopy of the knee.  Both facets of the patella appeared to have no chondral defects.  There are no free bodies in the suprapatellar pouch.  Medial gutter was entered there were no free bodies in the medial gutter were extruded meniscus or rim tears.  The camera was inserted to the lateral gutter and there were no loose bodies or peripheral rim tears or meniscal extrusion visible as well.  Next the camera was placed in the notch.  The spinal needle was used to localize the position for the anterior medial portal which was created with 11 blade.  The probe was inserted.  The camera was inserted into the medial compartment wall providing a valgus stress on the knee.  The meniscus was intact except for a small 4 mm radial tear of the posterior horn of the medial  meniscus.  Shaver was entered.  The radial tear was resected with a shaver.  The probe was reinserted and the meniscus was stable after this.  The camera was placed back into the notch.  ACL was visualized and confirmed to be torn both visually and with the probe.  The PCL was intact.  The lateral compartment was entered with a varus stress.  The lateral meniscus was fully intact and probed and found to be stable with no tears.  The shaver and forward biter were used to resect the remnant ACL and visualize the lateral femoral condyle.  Spinal needle was used to localize the appropriate anterior medial portal used to perform drilling of the femoral tunnel.  The guide and pain were inserted into the knee joint into the appropriate starting position with a 2 mm rim of bone between the posterior femoral cortex.  The pin was drilled.  The appropriate reamer for a 9 mm tunnel was used to ream the tunnel.  The passing suture was passed through the tunnel.  Next the tibial guide was inserted into the knee joint.  Tibial pain and associated reamer were placed.  The tibial tunnel was appropriately reamed in excellent position approximately 9 mm from the inner meniscal ligament medial eminence.  Soft tissue was debrided from the tibial tunnel site.  The hamstrings graft was taken off tension and the attached a tight rope button was passed through the tibial tunnel.  The button was passed into the femoral tunnel with the shuttling suture and the button flipped on the cortex under direct visualization with the camera.  This was tested with tension and was appropriate.  Next the sutures were used to shuttle the graft into the knee docket into the femoral tunnel which was in line with our measured marked.  Tension was held on the graft through the tibial tunnel and the knee was extended under camera visualization to ensure that there would be no graft impingement.  There was no graft impingement.  The tibial tunnel was dilated but  the graft was held in tension and full flexion with a posterior drawer and a 10 mm graft bolt was placed.  Repeat examination was performed and the patient had a negative Lachman.  The knee was extremely stable.  Excess graft was amputated at the tibial tunnel site.  The camera was inserted to the need to ensure that both was not intra-articular.  All wounds were irrigated.  The tibial wound was closed with 2-0 Vicryl and 3-0 nylon.  The tourniquet was deflated.  All portals were closed with 3-0 nylon.  A soft dressing was placed in a hinged knee immobilizer was placed locked in extension.  Patient was awakened from general endotracheal anesthesia having suffered no untoward events.    Postop plan:  The patient will be locked in extension his knee immobilizer for approximately 2 weeks.  We will get him into therapy when he comes in for his postop visit with our ACL rehab protocol plan.  We will remove his sutures at that time.  We will unlock his brace at that time as well.      Hola Gomez MD

## 2022-05-03 NOTE — HISTORICAL [facility] CERNER
This is a historical note converted from Sally. Formatting and pictures may have been removed.  Please reference Sally for original formatting and attached multimedia. Chief Complaint  F/U Rt knee pain  History of Present Illness  Mr Alvarez is a 34-year-old male presents to our clinic today for follow-up assessment of his right knee. ?He underwent a right knee ACL reconstruction with a hamstring autograft and partial medial meniscectomy by Dr. NOVOA on April 23 of 2019. ?The patient was doing very well following surgery and return to all of his previous level of activities.  ?  The patient reports approximately 5 to 7 days ago he was doing some work in his backyard when he tripped over a garden hose. ?He was a twisting injury to the right knee. ?He felt a pop at the time of injury. ?There is immediate pain and a large amount of swelling. ?He was unable to ambulate. ?He presented to his local emergency room for assessment. ?He was referred to our clinic for?definitive assessment and follow-up management.  ?  Patient works as a .? He is not involved in any sports outside of work.  Review of Systems  Constitutional:?no fever, fatigue, weakness  Eye:?no vision loss, eye redness, drainage, or pain  ENMT:?no sore throat, ear pain, sinus pain/congestion, nasal congestion/drainage  Respiratory:?no cough, no wheezing, no shortness of breath  Cardiovascular:?no chest pain, no palpitations, no edema  ?  ?  Physical Exam  Vitals & Measurements  HT:?187?cm? WT:?113.39?kg? BMI:?32.43?  Patient Norvasc intact distally in the right leg. ?His range of motion from 5 degrees of extension to 90 degrees of flexion. ?There is a large effusion. ?His knee feels stable?to anterior posterior drawer as well as a Lockman.? Cannot perform a pivot shift test in clinic today secondary to the patients?pain. ?He has market medial and lateral joint line tenderness.  Assessment/Plan  1.?Right knee pain?M25.561  Ordered:  Office/Outpatient  Visit Level 3 Established [id number] PC, Locked knee  Right knee pain, [facility], 02/19/20 15:03:00 CST  ?  2.?Locked knee?M23.90  ?34-year-old male locked right knee status post ACL reconstruction with hamstring autograft and partial medial meniscectomy.? At this time I am concerned that the patient has torn his ACL and has a large cyclops lesion versus?bucket meniscal tear. ?I have ordered an urgent MRI?of the right knee?for further assessment.? The patient will come back to see us in clinic as soon as the MRIs been completed to review the results.  Ordered:  Clinic Follow up, *Est. 03/11/20 3:00:00 CDT, Order for future visit, Locked knee, [facility]  MRI Ext Lower Joint Right W/O Contrast, Routine, *Est. 02/19/20 3:00:00 CST, Other (please specify), Knee instability, None, Ambulatory, Locked knee. Please complete within 1 week., Rad Type, Order for future visit, Locked knee, Schedule this test, [facility], *Est. 02...  Office/Outpatient Visit Level 3 Established [id number] PC, Locked knee  Right knee pain, [facility], 02/19/20 15:03:00 CST  ?   Medications  acetaminophen-hydrocodone 325 mg-7.5 mg oral tablet, 1 tab(s), Oral, q6hr,? ?Not Taking, Completed Rx  acetaminophen-oxycodone 325 mg-5 mg oral tablet, 1 tab(s), Oral, QID,? ?Not taking  ibuprofen 600 mg oral tablet, 600 mg= 1 tab(s), Oral, q6hr  Norco 7.5 mg-325 mg oral tablet, 1 tab(s), Oral, q6hr,? ?Not Taking, Completed Rx  Diagnostic Results  X-ray right knee [4 views: AP, lateral, oblique, sunrise] performed today reviewed with patient:?ACL tunnels and button appear in excellent position and well fixed. ?Sclerotic?area on the medial tibial plateau. ?Otherwise no acute osseous abnormalities.

## 2022-05-03 NOTE — HISTORICAL [facility]ER
This is a historical note converted from Sally. Formatting and pictures may have been removed.  Please reference Sally for original formatting and attached multimedia. Chief Complaint  Referral for Rt knee pain  History of Present Illness  33?yo?female?smoker?presents to ortho clinic for?initial visit?for?right?knee pain.? Patient points to ?medial knee.  Today:? New patient referral for right knee pain  Onset:??Nov 2018????fluctuates in intensity  Current pain level: 6/10??without pain medication. Quality described as?sharp?.? Patient?denies?use of pain medication today.?  Medications r/t complaint: Patient reports using?RX / OTC?medications in past including:?Norco, OTC ibuprofen/Tylenol?RX per ED/PCP. ?Currently taking?same??PRN?pain with?mild?relief.?  Modifying Factors: ?Worse with/after activity;Improved with rest;??No stiffness??  Injury:?Patient tripped over a log and landed on a twist position  Previous treatment: HEP?denies ?; RX NSAIDs, PT??denies , CSI denies , VS denies  Associated Symptoms:?Crepitus/Grinding;??popping; locks?into place?when I?extended all?the way;??Swelling noted to knee with increased activity;?No skin changes;?Weakness of right knee with falls;?Mild decrease in ROM;?difficulty sleeping at night s/t pain  Activity:?Sedentary, full ADLs;?Pain interferes with function/daily activity (mild)?Patient?acknowledges?use of assistive devices??for assistance with ambulation.  PMH:? denies CVD; denies DM ;denies ?RA;denies ?gout;denies ?RX anticoagulants; denies intolerance to NSAIDs s/t GI issues; denies intolerance to NSAIDs s/t kidney disease  Family History:?Family history of arthritis  PCP:?[facility]?EVA Orozco?, referral source same  Employment:?Patient is a  as a profession, currently employed  Note: none  Review of Systems  Constitutional:No fever;No chills;No weight loss  CV:No swelling;No edema  GI:No urinary retention;No urinary  incontinence  :No fecal incontinence  Skin:No rash;No wound  Neuro:No numbness/tingling;No weakness;No saddle anesthesia  MSK: As above  Psych:No depression;No anxiety  Heme/Lymph:No easy bruising;No easy bleeding;No lymphadenopathy  Immuno:No MRSA history  Physical Exam  Vitals & Measurements  T:?36.4? ?C (Oral)? HR:?71(Peripheral)? RR:?12? BP:?123/72?  HT:?188?cm? WT:?110.8?kg? BMI:?31.35?  MSK: ?Right??KNEE  General: No apparent distress;?well-nourished  Inspection:?Normal gait/ station;??full weight bearing;??no swelling;?no erythema;?No contusion;?atrophy / deconditioning noted- mild quad;?normal alignment?  Palpation:?tenderness noted at medial joint line;tenderness noted at medial retinaculum?Crepitus:?Negative;?Normal temperature  ROM:?  ?????Active Extension/Flexion (0-140):?0-125  Strength:?  ?????Flexion??4/5  ?????Extension??4/5  Special Tests:  ?????a) Effusion Testing:  ??????????Ballotable Effusion: ?Negative  ??????????Fluid Wave:?Negative  ?????b) Patellar Testing:  ??????????Patellar Grind: ?Negative  ??????????Apprehension:?Negative  ??????????Patella?Facet?Tenderness:?Positive  ?????c) Meniscal Tear Assessment:?  ??????????Isela:?Positive?  ??????????Apleys Compression:?Positive  ?????d) Ligamentous Testing:  ?????????ACL Anterior Drawer:?Positive  ?????????PCL Posterior Drawer:?Negative  ??? ?????LCL Varus Test:?Negative  ?????????MCL Valgus Test:?Negative?????  Neurovascular:?Intact; 2+?distal pulse, sensation intact to light touch  Neuro/Psych: Awake, Alert, Oriented; Normal mood and affect  Lymphatic: No LAD  Skin and Soft Tissue: No bruising, No ecchymosis; No rash; Skin intact  Cardio: RRR, no murmur/rubs  Assessment/Plan  1.?Complete tear of right ACL  ?1.? Discussed with patient diagnosis and treatment recommendations.? Handout given.  2.? Imaging:?Radiological studies ordered,?reviewed,?and independently interpreted by me; discussed with patient.? MRI reviewed and discussed with  patient  3.? Treatment plan: Conservative treatment to include oral glucosamine 1500 mg/day; Topical capsaicin as needed; Hot or cold therapy; Adequate vitamin C/D intake  4.? Procedure:?Recommend referral to ortho for discussion of surgical repair  5.? Activity:?Activity as tolerated; HEP to included aerobic conditioning with non-painful activity and ROM/STG exercises;?recommend exercise bike with RPM set at 80 or higher build to 40 minutes 3xs per week as tolerated; ?proper footwear; assistive device to avoid limping?  6.? Therapy:none  7.? Medication:?First line treatment with daily ?acetaminophen 1000 mg three times daily; Medication precautions given; continue medications as RX per PCP  8.? RTC:?with ortho?res 4/15/19?  9.? Additional work-up: discontinue?smoking  Ordered:  Clinic Follow up, *Est. 04/15/19 3:00:00 CDT, Order for future visit, Tear of medial meniscus of right knee  Complete tear of right ACL  Obesity, [facility]  Office/Outpatient Visit Level 4 New [id number] PC, Complete tear of right ACL  Obesity, [facility], 04/08/19 14:58:00 CDT  ?  2.?Tear of medial meniscus of right knee  ?same as above  Ordered:  Clinic Follow up, *Est. 04/15/19 3:00:00 CDT, Order for future visit, Tear of medial meniscus of right knee  Complete tear of right ACL  Obesity, [facility]  ?  3.?Obesity  ?Patient educated that diet modifications with low impact exercises as tolerated for reduction in BMI would improve overall treatment and outcome.?  Ordered:  Clinic Follow up, *Est. 04/15/19 3:00:00 CDT, Order for future visit, Tear of medial meniscus of right knee  Complete tear of right ACL  Obesity, [facility]  Office/Outpatient Visit Level 4 New [id number] PC, Complete tear of right ACL  Obesity, [facility], 04/08/19 14:58:00 CDT  ?  Orders:  XR Knee Right 4 or More Views, Routine, 04/08/19 13:32:00 CDT, Pain, pain, None, Ambulatory, Rad Type, Right knee pain, Not Scheduled, 04/08/19 13:32:00  CDT  Discussed and reviewed patient HPI, PE, XR, MRI .? Dr. Curtis examined patient.? Recommends ortho res appt for surgical treatment.? Patient RTC with ortho res 4/15/19 and scheduled for surgery 4/3/19.? Patient instructed to stop smoking in preparation and avoid uneven surfaces, wet surfaces, and places/situations where he can not see his feet . Agrees to treatment and verbalized understanding.   Problem List/Past Medical History  Ongoing  Obesity  Historical  No qualifying data  Procedure/Surgical History  none   Medications  No active medications  Allergies  penicillin?(unknown)  traMADol?(heart palpatation)  Social History  Alcohol  Current, Beer, 3-5 times per week, 04/08/2019  Sexual  Gender Identity Identifies as male., 04/08/2019  Substance Abuse  Current, Marijuana, 04/08/2019  Tobacco  10 or more cigarettes (1/2 pack or more)/day in last 30 days, Cigarettes, No, Ready to change: No. 20 per day., 04/08/2019  Family History  Diabetes mellitus type 2: Mother.  Heart disease: Sister.  Heart failure.: Mother.  Hypertension.: Mother.  Health Maintenance  Health Maintenance  ???Pending?(in the next year)  ??? ??Due?  ??? ? ? ?Alcohol Misuse Screening due??04/08/19??and every 1??year(s)  ??? ? ? ?Depression Screening due??04/08/19??and every?  ??? ? ? ?Tetanus Vaccine due??04/08/19??and every 10??year(s)  ??? ??Due In Future?  ??? ? ? ?Blood Pressure Screening not due until??04/07/20??and every 1??year(s)  ??? ? ? ?Body Mass Index Check not due until??04/07/20??and every 1??year(s)  ???Satisfied?(in the past 1 year)  ??? ??Satisfied?  ??? ? ? ?ADL Screening on??04/08/19.??Satisfied by Georgi Hager  ??? ? ? ?Blood Pressure Screening on??04/08/19.??Satisfied by Georgi Hager  ??? ? ? ?Body Mass Index Check on??04/08/19.??Satisfied by Georgi Hager  ??? ? ? ?Obesity Screening on??04/08/19.??Satisfied by Georgi Hager  ?  ?  Diagnostic Results  XR right knee obtained 4/8/19; no acute findings;  awaiting radiologist findings.  ?  MRI dated 12/13/18 from [facility] EVA Lindquist per referral:  Impressions:  1.? ACL tear with associated bone contusions and lateral femoral condyle and lateral tibial plateau.  2.? Horizontal oblique tear of the posterior horn of the medial meniscus.

## 2024-06-21 ENCOUNTER — HOSPITAL ENCOUNTER (EMERGENCY)
Facility: HOSPITAL | Age: 39
Discharge: HOME | End: 2024-06-21
Attending: EMERGENCY MEDICINE
Payer: MEDICAID

## 2024-06-21 VITALS
RESPIRATION RATE: 18 BRPM | HEART RATE: 87 BPM | WEIGHT: 264.88 LBS | TEMPERATURE: 98 F | BODY MASS INDEX: 33.99 KG/M2 | DIASTOLIC BLOOD PRESSURE: 96 MMHG | OXYGEN SATURATION: 97 % | SYSTOLIC BLOOD PRESSURE: 155 MMHG | HEIGHT: 74 IN

## 2024-06-21 DIAGNOSIS — S22.41XA CLOSED FRACTURE OF MULTIPLE RIBS OF RIGHT SIDE, INITIAL ENCOUNTER: Primary | ICD-10-CM
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
## 2024-06-21 PROCEDURE — 25000003 PHARM REV CODE 250: Mod: ER | Performed by: NURSE PRACTITIONER

## 2024-06-21 PROCEDURE — 99900035 HC TECH TIME PER 15 MIN (STAT): Mod: ER

## 2024-06-21 PROCEDURE — 99283 EMERGENCY DEPT VISIT LOW MDM: CPT | Mod: 25,ER

## 2024-06-21 RX ORDER — HYDROCODONE BITARTRATE AND ACETAMINOPHEN 10; 325 MG/1; MG/1
1 TABLET ORAL
Status: COMPLETED | OUTPATIENT
Start: 2024-06-21 | End: 2024-06-21

## 2024-06-21 RX ORDER — HYDROCODONE BITARTRATE AND ACETAMINOPHEN 10; 325 MG/1; MG/1
1 TABLET ORAL EVERY 6 HOURS PRN
Qty: 12 TABLET | Refills: 0 | Status: SHIPPED | OUTPATIENT
Start: 2024-06-21 | End: 2024-06-24

## 2024-06-21 RX ADMIN — HYDROCODONE BITARTRATE AND ACETAMINOPHEN 1 TABLET: 10; 325 TABLET ORAL at 01:06

## 2024-06-21 NOTE — Clinical Note
"Donnell iLndersharon Alvarez was seen and treated in our emergency department on 6/21/2024.  He may return to work on 06/23/2024.       If you have any questions or concerns, please don't hesitate to call.      Irvin Rodriguez NP"

## 2024-06-21 NOTE — ED PROVIDER NOTES
Encounter Date: 6/21/2024       History     Chief Complaint   Patient presents with    Fall     Patient complains of right sided rib pain after fall 2 days ago.     Patient presents to ER for right lower anterolateral rib pain, onset 1 week ago after experiencing a fall out of a truck bed.  Contrary to triage chief complaint, patient states the incident occurred 1 week ago.  Pain has been constant since onset.  He has tried nothing for the pain.  He denies head injury or loss of consciousness.  He denies neck pain, back pain, shortness of breath, chest pain, abdominal pain, abdominal bruising, nausea, vomiting, weakness, fatigue.    The history is provided by the patient.     Review of patient's allergies indicates:   Allergen Reactions    Pcn [penicillins] Anaphylaxis    Tramadol Palpitations     Past Medical History:   Diagnosis Date    Anxiety     Depression     Osgood-Schlatter's disease of left lower extremity     Vertigo      Past Surgical History:   Procedure Laterality Date    LEG SURGERY      right    NO PAST SURGERIES       Family History   Problem Relation Name Age of Onset    No Known Problems Mother      No Known Problems Father       Social History     Tobacco Use    Smoking status: Every Day     Current packs/day: 0.50     Types: Cigarettes    Smokeless tobacco: Never   Substance Use Topics    Alcohol use: Yes     Alcohol/week: 2.0 standard drinks of alcohol     Types: 2 Cans of beer per week     Comment: daily     Drug use: Not Currently     Review of Systems   Constitutional:  Negative for chills, fatigue and fever.   HENT:  Negative for congestion and ear pain.    Eyes:  Negative for pain.   Respiratory:  Negative for apnea, cough, chest tightness and shortness of breath.    Cardiovascular:  Negative for chest pain and palpitations.   Gastrointestinal:  Negative for abdominal pain, nausea and vomiting.   Genitourinary:  Negative for dysuria.   Musculoskeletal:  Negative for back pain, myalgias and  neck pain.        +rib pain   Skin:  Negative for rash and wound.   Neurological:  Negative for syncope, weakness, numbness and headaches.       Physical Exam     Initial Vitals [06/21/24 1217]   BP Pulse Resp Temp SpO2   (!) 155/96 85 20 97.8 °F (36.6 °C) 97 %      MAP       --         Physical Exam    Nursing note and vitals reviewed.  Constitutional: He is not diaphoretic. He is cooperative.  Non-toxic appearance. He does not have a sickly appearance. He does not appear ill. No distress.   HENT:   Head: Normocephalic and atraumatic.   Mouth/Throat: Oropharynx is clear and moist.   Eyes: Conjunctivae are normal.   Neck: Neck supple.   Normal range of motion.  Cardiovascular:  Normal rate, regular rhythm and intact distal pulses.           Pulmonary/Chest: Breath sounds normal. No respiratory distress. He has no wheezes. He has no rhonchi. He has no rales. He exhibits tenderness (Right anterolateral lower rib tenderness.  There is no obvious deformity. No Bruising, no erythema, no edema, no open wound.).   Breath sounds clear to auscultation bilaterally.   Abdominal: Abdomen is soft. He exhibits no distension. There is no abdominal tenderness.   There is no abdominal bruising or tenderness. There is no guarding.   Musculoskeletal:         General: Normal range of motion.      Cervical back: Normal range of motion and neck supple.     Neurological: He is alert and oriented to person, place, and time. He has normal strength. No sensory deficit. GCS score is 15. GCS eye subscore is 4. GCS verbal subscore is 5. GCS motor subscore is 6.   Skin: Skin is warm and dry. Capillary refill takes less than 2 seconds.         ED Course   Procedures  Labs Reviewed - No data to display       Imaging Results              X-Ray Ribs 2 View Right (Final result)  Result time 06/21/24 12:47:41      Final result by Mario Feldman MD (06/21/24 12:47:41)                   Impression:      6-8th rib fractures on the  right.      Electronically signed by: Mario Feldman  Date:    06/21/2024  Time:    12:47               Narrative:    EXAMINATION:  XR RIBS 2 VIEW RIGHT    CLINICAL HISTORY:  Unspecified fall, initial encounter    TECHNIQUE:  Two views of the right ribs were performed.    COMPARISON:  None    FINDINGS:  Acute displaced right 7th rib fracture.  Additional 6th and 8th rib acute displaced fractures.  No definite associated pneumothorax.                                       Medications   HYDROcodone-acetaminophen  mg per tablet 1 tablet (1 tablet Oral Given 6/21/24 1319)     Medical Decision Making  Amount and/or Complexity of Data Reviewed  Radiology: ordered.    Risk  Prescription drug management.                   Discussed results with patient he verbalized understanding with no further concerns.  He is nontoxic/non ill-appearing.  Vital signs are stable.  SpO2 97% on room air.  Afebrile.  Patient denies shortness of breath or chest pain.  Patient states incident occurred 1 week ago.  Incentive spirometer provided here in the ER with instruction on usage via respiratory therapist. initial incentive spirometer volume 3500ml, per respiratory therapist.  Norco Rx provided.  Discussed the use of pillow splinting at home.  Discussed close follow-up with his PCP.  Discussed in detail signs and symptoms to return to ER.  Patient agrees with this plan and voices no further concerns.    I discussed with patient that evaluation in the ED does not suggest any emergent or life threatening medical conditions requiring immediate intervention beyond what was provided in the ED, and I believe patient is safe for discharge. Regardless, an unremarkable evaluation in the ED does not preclude the development or presence of a serious of life threatening condition. As such, patient was instructed to return immediately for any worsening or change in current symptoms.                     Clinical Impression:  Final  diagnoses:  [W19.XXXA] Fall  [S22.41XA] Closed fracture of multiple ribs of right side, initial encounter (Primary)          ED Disposition Condition    Discharge Stable          ED Prescriptions       Medication Sig Dispense Start Date End Date Auth. Provider    HYDROcodone-acetaminophen (NORCO)  mg per tablet Take 1 tablet by mouth every 6 (six) hours as needed for Pain. 12 tablet 6/21/2024 6/24/2024 Irvin Rodriguez, NP          Follow-up Information       Follow up With Specialties Details Why Contact [facility]    Clinic, [facility]  In 2 days  [address] [facility] [address] [place] [id number] [phone]      [place] - Emergency Dept Emergency Medicine  As needed, If symptoms worsen [address] [address] [address] [phone]             Irvin Rodriguez, NP  06/21/24 [id number]

## 2025-04-07 ENCOUNTER — HOSPITAL ENCOUNTER (OUTPATIENT)
Dept: RADIOLOGY | Facility: HOSPITAL | Age: 40
Discharge: HOME | End: 2025-04-07
Attending: NURSE PRACTITIONER
Payer: MEDICAID

## 2025-04-07 ENCOUNTER — HOSPITAL ENCOUNTER (OUTPATIENT)
Dept: CARDIOLOGY | Facility: HOSPITAL | Age: 40
Discharge: HOME | End: 2025-04-07
Attending: NURSE PRACTITIONER
Payer: MEDICAID

## 2025-04-07 DIAGNOSIS — R07.9 CHEST PAIN, UNSPECIFIED: Primary | ICD-10-CM

## 2025-04-07 DIAGNOSIS — R07.9 CHEST PAIN, UNSPECIFIED: ICD-10-CM

## 2025-04-07 LAB
OHS QRS DURATION: 90 MS
OHS QTC CALCULATION: 427 MS

## 2025-04-07 PROCEDURE — 93005 ELECTROCARDIOGRAM TRACING: CPT | Mod: PO

## 2025-04-07 PROCEDURE — 93010 ELECTROCARDIOGRAM REPORT: CPT | Mod: ,,, | Performed by: INTERNAL MEDICINE

## 2025-04-07 PROCEDURE — 71046 X-RAY EXAM CHEST 2 VIEWS: CPT | Mod: TC,PO

## 2025-04-07 PROCEDURE — 71046 X-RAY EXAM CHEST 2 VIEWS: CPT | Mod: 26,,, | Performed by: STUDENT IN AN ORGANIZED HEALTH CARE EDUCATION/TRAINING PROGRAM
